# Patient Record
(demographics unavailable — no encounter records)

---

## 2024-12-18 NOTE — PHYSICAL EXAM
[Alert] : alert [No Acute Distress] : no acute distress [Normocephalic] : normocephalic [Conjunctivae with no discharge] : conjunctivae with no discharge [PERRL] : PERRL [EOMI Bilateral] : EOMI bilateral [Auricles Well Formed] : auricles well formed [Clear Tympanic membranes with present light reflex and bony landmarks] : clear tympanic membranes with present light reflex and bony landmarks [No Discharge] : no discharge [Nares Patent] : nares patent [Pink Nasal Mucosa] : pink nasal mucosa [Palate Intact] : palate intact [Nonerythematous Oropharynx] : nonerythematous oropharynx [Supple, full passive range of motion] : supple, full passive range of motion [No Palpable Masses] : no palpable masses [Symmetric Chest Rise] : symmetric chest rise [Clear to Auscultation Bilaterally] : clear to auscultation bilaterally [Regular Rate and Rhythm] : regular rate and rhythm [Normal S1, S2 present] : normal S1, S2 present [No Murmurs] : no murmurs [+2 Femoral Pulses] : +2 femoral pulses [Soft] : soft [NonTender] : non tender [Non Distended] : non distended [Normoactive Bowel Sounds] : normoactive bowel sounds [No Hepatomegaly] : no hepatomegaly [No Splenomegaly] : no splenomegaly [Donal: _____] : Donal [unfilled] [Testicles Descended Bilaterally] : testicles descended bilaterally [Patent] : patent [No fissures] : no fissures [No Abnormal Lymph Nodes Palpated] : no abnormal lymph nodes palpated [No Gait Asymmetry] : no gait asymmetry [No pain or deformities with palpation of bone, muscles, joints] : no pain or deformities with palpation of bone, muscles, joints [Normal Muscle Tone] : normal muscle tone [Straight] : straight [+2 Patella DTR] : +2 patella DTR [Cranial Nerves Grossly Intact] : cranial nerves grossly intact [No Rash or Lesions] : no rash or lesions

## 2024-12-18 NOTE — REVIEW OF SYSTEMS
Pt called stating that she does not care who we send her to for ENT as long as they are well established and know what they are doing. She said her and her daughter didn't know any ENT's she just wants a good one.  Who would you like her to see?    [Negative] : Endocrine

## 2024-12-18 NOTE — HISTORY OF PRESENT ILLNESS
[Mother] : mother [Fruit] : fruit [Vegetables] : vegetables [Meat] : meat [Grains] : grains [Eats healthy meals and snacks] : eats healthy meals and snacks [Eats meals with family] : eats meals with family [Normal] : Normal [Brushing teeth twice/d] : brushing teeth twice per day [Yes] : Patient goes to dentist yearly [Toothpaste] : Primary Fluoride Source: Toothpaste [Playtime (60 min/d)] : playtime 60 min a day [Participates in after-school activities] : participates in after-school activities [< 2 hrs of screen time per day] : less than 2 hrs of screen time per day [Appropiate parent-child-sibling interaction] : appropriate parent-child-sibling interaction [Has Friends] : has friends [Has chance to make own decisions] : has chance to make own decisions [Grade ___] : Grade [unfilled] [Adequate social interactions] : adequate social interactions [Adequate behavior] : adequate behavior [Adequate performance] : adequate performance [Adequate attention] : adequate attention [No difficulties with Homework] : no difficulties with homework [No] : No cigarette smoke exposure [Appropriately restrained in motor vehicle] : appropriately restrained in motor vehicle [Supervised outdoor play] : supervised outdoor play [Supervised around water] : supervised around water [Wears helmet and pads] : wears helmet and pads [Parent knows child's friends] : parent knows child's friends [Parent discusses safety rules regarding adults] : parent discusses safety rules regarding adults [Family discusses home emergency plan] : family discusses home emergency plan [Up to date] : Up to date [Exposure to alcohol] : no exposure to alcohol [Exposure to tobacco] : no exposure to tobacco [Exposure to electronic nicotine delivery system] : No exposure to electronic nicotine delivery system [Exposure to illicit drugs] : no exposure to illicit drugs [de-identified] : General Education  with para. Receives reading and math extra help.

## 2024-12-18 NOTE — DISCUSSION/SUMMARY
[Normal Growth] : growth [Normal Development] : development [None] : No known medical problems [No Elimination Concerns] : elimination [No Feeding Concerns] : feeding [No Skin Concerns] : skin [Normal Sleep Pattern] : sleep [School] : school [Development and Mental Health] : development and mental health [Nutrition and Physical Activity] : nutrition and physical activity [Oral Health] : oral health [Safety] : safety [No Medications] : ~He/She~ is not on any medications [Patient] : patient [Full Activity without restrictions including Physical Education & Athletics] : Full Activity without restrictions including Physical Education & Athletics [I have examined the above-named student and completed the preparticipation physical evaluation. The athlete does not present apparent clinical contraindications to practice and participate in sport(s) as outlined above. A copy of the physical exam is on r] : I have examined the above-named student and completed the preparticipation physical evaluation. The athlete does not present apparent clinical contraindications to practice and participate in sport(s) as outlined above. A copy of the physical exam is on record in my office and can be made available to the school at the request of the parents. If conditions arise after the athlete has been cleared for participation, the physician may rescind the clearance until the problem is resolved and the potential consequences are completely explained to the athlete (and parents/guardians). [FreeTextEntry1] : Impression: No growth, development, elimination, feeding, skin and sleep concerns. No known medical problems. No medications. Information discussed with parent/guardian.   The patient should participate in 60 minutes or more of physical activity a day. Encourage structured physical activity when possible (ie, participation in team or individual sports, or supervised exercise sessions). The patient would be more likely to participate consistently in these activities because they would be accountable to a  or leader. The patient may engage in a gym or fitness center if possible. Educational material relating to physical activity was provided to the patient. Continue balanced diet with all food groups. Brush teeth twice a day with toothbrush. Recommend visit to dentist. Help child to maintain consistent daily routines and sleep schedule. School discussed. Ensure home is safe. Taught child about personal safety.  Flu vaccine given  Continue with para and services needed for ADHD. Follow up in 1 year for well visit
